# Patient Record
Sex: FEMALE | Race: BLACK OR AFRICAN AMERICAN | NOT HISPANIC OR LATINO | URBAN - METROPOLITAN AREA
[De-identification: names, ages, dates, MRNs, and addresses within clinical notes are randomized per-mention and may not be internally consistent; named-entity substitution may affect disease eponyms.]

---

## 2018-06-08 ENCOUNTER — OFFICE VISIT (OUTPATIENT)
Dept: URGENT CARE | Facility: URGENT CARE | Age: 23
End: 2018-06-08
Payer: COMMERCIAL

## 2018-06-08 VITALS
SYSTOLIC BLOOD PRESSURE: 106 MMHG | DIASTOLIC BLOOD PRESSURE: 70 MMHG | RESPIRATION RATE: 14 BRPM | WEIGHT: 150.6 LBS | TEMPERATURE: 98.1 F | OXYGEN SATURATION: 97 % | HEART RATE: 86 BPM

## 2018-06-08 DIAGNOSIS — T07.XXXA INFECTED INSECT BITES OF MULTIPLE SITES: Primary | ICD-10-CM

## 2018-06-08 DIAGNOSIS — L03.114 CELLULITIS OF LEFT UPPER EXTREMITY: ICD-10-CM
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
## 2018-06-08 DIAGNOSIS — L08.9 INFECTED INSECT BITES OF MULTIPLE SITES: Primary | ICD-10-CM

## 2018-06-08 PROCEDURE — 99203 OFFICE O/P NEW LOW 30 MIN: CPT | Performed by: PHYSICIAN ASSISTANT

## 2018-06-08 RX ORDER — CEFUROXIME AXETIL 500 MG/1
500 TABLET ORAL 2 TIMES DAILY
Qty: 20 TABLET | Refills: 0 | Status: SHIPPED | OUTPATIENT
Start: 2018-06-08

## 2018-06-08 RX ORDER — DIPHENHYDRAMINE HCL 25 MG
25-50 TABLET ORAL EVERY 6 HOURS PRN
Qty: 30 TABLET | Refills: 1 | Status: SHIPPED | OUTPATIENT
Start: 2018-06-08

## 2018-06-08 NOTE — MR AVS SNAPSHOT
"              After Visit Summary   2018    Marilou Mcgraw    MRN: [id number]           Patient Information     Date Of Birth          1995        Visit Information        Provider Department      2018 12:15 PM Sarah Jackson PA-C [facility]        Today's Diagnoses     Infected insect bites of multiple sites    -  1    Cellulitis of left upper extremity           Follow-ups after your visit        Who to contact     If you have questions or need follow up information about today's clinic visit or your schedule please contact [facility] directly at [phone].  Normal or non-critical lab and imaging results will be communicated to you by [employer], letter or phone within 4 business days after the clinic has received the results. If you do not hear from us within 7 days, please contact the clinic through [employer] or phone. If you have a critical or abnormal lab result, we will notify you by phone as soon as possible.  Submit refill requests through [employer] or call your pharmacy and they will forward the refill request to us. Please allow 3 business days for your refill to be completed.          Additional Information About Your Visit        MyChart Information     [employer] lets you send messages to your doctor, view your test results, renew your prescriptions, schedule appointments and more. To sign up, go to [email or web address] . Click on \"Log in\" on the left side of the screen, which will take you to the Welcome page. Then click on \"Sign up Now\" on the right side of the page.     You will be asked to enter the access code listed below, as well as some personal information. Please follow the directions to create your username and password.     Your access code is: [email or web address]  Expires: 2018  1:04 PM     Your access code will  in 90 days. If you need help or a new code, please call your [facility] or [phone].        Care " EveryWhere ID     This is your Care EveryWhere ID. This could be used by other organizations to access your [facility] medical records  [id number]        Your Vitals Were     Pulse Temperature Respirations Pulse Oximetry          86 98.1  F (36.7  C) (Oral) 14 97%         Blood Pressure from Last 3 Encounters:   06/08/18 106/70    Weight from Last 3 Encounters:   06/08/18 150 lb 9.6 oz (68.3 kg)              Today, you had the following     No orders found for display         Today's Medication Changes          These changes are accurate as of 6/8/18  1:04 PM.  If you have any questions, ask your nurse or doctor.               Start taking these medicines.        Dose/Directions    cefuroxime 500 MG tablet   Commonly known as:  CEFTIN   Used for:  Infected insect bites of multiple sites   Started by:  Sarah Jackson PA-C        Dose:  500 mg   Take 1 tablet (500 mg) by mouth 2 times daily   Quantity:  20 tablet   Refills:  0       diphenhydrAMINE 25 MG tablet   Commonly known as:  BENADRYL   Used for:  Infected insect bites of multiple sites   Started by:  Sarah Jackson PA-C        Dose:  25-50 mg   Take 1-2 tablets (25-50 mg) by mouth every 6 hours as needed for itching or allergies   Quantity:  30 tablet   Refills:  1            Where to get your medicines      These medications were sent to [facility] [id number]  [address], [facility] [address]     Phone:  [phone]     cefuroxime 500 MG tablet    diphenhydrAMINE 25 MG tablet                Primary Care Provider Fax #    Physician No Ref-Primary [phone]       No address on file        Equal Access to Services     [facility]: Hadii davina magana Sogenesis, waaxda luqadaha, qaybta kaalmada adeegyajovanny, edd kothari. So [facility] [phone].    ATENCIÓN: Si habla español, tiene a beltran disposición servicios gratuitos de asistencia lingüística. Llame al  [phone].    We comply with applicable federal civil rights laws and [place] laws. We do not discriminate on the basis of race, color, national origin, age, disability, sex, sexual orientation, or gender identity.            Thank you!     Thank you for choosing [facility]  for your care. Our goal is always to provide you with excellent care. Hearing back from our patients is one way we can continue to improve our services. Please take a few minutes to complete the written survey that you may receive in the mail after your visit with us. Thank you!             Your Updated Medication List - Protect others around you: Learn how to safely use, store and throw away your medicines at www.disposemymeds.org.          This list is accurate as of 6/8/18  1:04 PM.  Always use your most recent med list.                   Brand Name Dispense Instructions for use Diagnosis    cefuroxime 500 MG tablet    CEFTIN    20 tablet    Take 1 tablet (500 mg) by mouth 2 times daily    Infected insect bites of multiple sites       diphenhydrAMINE 25 MG tablet    BENADRYL    30 tablet    Take 1-2 tablets (25-50 mg) by mouth every 6 hours as needed for itching or allergies    Infected insect bites of multiple sites

## 2018-06-08 NOTE — PROGRESS NOTES
S: 22-year-old female here for insect bites.  She woke up yesterday morning and noted to bite marks one on her left forearm and one on her left dorsal hand. Does itch. She put ice on the areas, no help.  Today though the area is red and swollen and tender.  It feels warm to touch.  She denies any facial or tongue swelling.  Denies any hives.  Denies any shortness of breath or wheezing.  No fever. No n/t.             Not on File    History reviewed. No pertinent past medical history.      No current outpatient prescriptions on file prior to visit.  No current facility-administered medications on file prior to visit.     Social History   Substance Use Topics     Smoking status: Never Smoker     Smokeless tobacco: Never Used     Alcohol use Not on file       ROS:  General: negative for fever  SKIN: + as above  Resp- no cough or wheezing.  ENT- no tongue swelling.  Neuro- no numbness or tingling    Physcial Exam:  /70  Pulse 86  Temp 98.1  F (36.7  C) (Oral)  Resp 14  Wt 150 lb 9.6 oz (68.3 kg)  SpO2 97%    GENERAL: alert, no acute distress  EYES: conjunctival clear  RESP: Regular breathing rate  NEURO: awake .  SKIN: Left dorsal forearm and left dorsal hand with apple-sized confluent redness and mild swelling.  Central punctate lesions palpated in both the areas.  It is warm to palpation and mildly tender.   Circulation and sensation intact.  Lungs- CTA  Face,lips, tongue- no swelling.    ASSESSMENT:    ICD-10-CM    1. Infected insect bites of multiple sites T07.XXXA cefuroxime (CEFTIN) 500 MG tablet    L08.9 diphenhydrAMINE (BENADRYL) 25 MG tablet    W57.XXXA    2. Cellulitis of left upper extremity L03.114        PLAN: Likely spider bites. Switch from ice to heat.  See today's orders.  Follow-up with primary clinic if not improving in 3 days in, sooner as needed..  Advised about symptoms which might herald more serious problems.  To the emergency room if lip facial or tongue swelling or shortness of  breath.  Sarah Jackson PA-C